# Patient Record
Sex: MALE | Race: WHITE
[De-identification: names, ages, dates, MRNs, and addresses within clinical notes are randomized per-mention and may not be internally consistent; named-entity substitution may affect disease eponyms.]

---

## 2018-11-02 ENCOUNTER — HOSPITAL ENCOUNTER (OUTPATIENT)
Dept: HOSPITAL 74 - JASU-SURG | Age: 53
Discharge: HOME | End: 2018-11-02
Attending: SURGERY
Payer: COMMERCIAL

## 2018-11-02 VITALS — TEMPERATURE: 99.6 F

## 2018-11-02 VITALS — DIASTOLIC BLOOD PRESSURE: 72 MMHG | SYSTOLIC BLOOD PRESSURE: 125 MMHG | HEART RATE: 88 BPM

## 2018-11-02 VITALS — BODY MASS INDEX: 27.8 KG/M2

## 2018-11-02 DIAGNOSIS — K40.90: Primary | ICD-10-CM

## 2018-11-02 LAB
APPEARANCE UR: CLEAR
BILIRUB UR STRIP.AUTO-MCNC: NEGATIVE MG/DL
COLOR UR: YELLOW
KETONES UR QL STRIP: NEGATIVE
LEUKOCYTE ESTERASE UR QL STRIP.AUTO: NEGATIVE
NITRITE UR QL STRIP: NEGATIVE
PH UR: 6 [PH] (ref 5–8)
PROT UR QL STRIP: NEGATIVE
PROT UR QL STRIP: NEGATIVE
SP GR UR: 1.02 (ref 1.01–1.03)
UROBILINOGEN UR STRIP-MCNC: NEGATIVE MG/DL (ref 0.2–1)

## 2018-11-02 PROCEDURE — 0YU60JZ SUPPLEMENT LEFT INGUINAL REGION WITH SYNTHETIC SUBSTITUTE, OPEN APPROACH: ICD-10-PCS | Performed by: SURGERY

## 2018-11-02 NOTE — SURG
Surgery First Assist Note


First Assist: Tra Roca PA-C (Suzy)


Date of Service: 11/02/18


Diagnosis: 





 left inguinal hernia


Procedure: 


Repair left inguinal hernia w/mesh


I was present for the entirety of the operative procedure. For further detail, 

please refer to operative report.








Visit type





- Case Type


Case Type: Scheduled





- Emergency


Emergency Visit: No





- New patient


This patient is new to me today: Yes


Date on this admission: 11/02/18





- Critical Care


Critical Care patient: No

## 2018-11-02 NOTE — OP
Operative Note





- Note:


Operative Date: 11/02/18


Pre-Operative Diagnosis: left inguinal hernia


Operation: repair left inguinal hernia w/mesh


Findings: 





sliding indirect left inguinal hernia


Surgeon: Chago Haider


Assistant: Tra Roca


Anesthesiologist/CRNA: [place]


Anesthesia: General


Specimens Removed: lipoma of the cord


Estimated Blood Loss (mls): 15

## 2018-11-12 NOTE — OP
DATE OF OPERATION:  11/02/2018

 

PREOPERATIVE DIAGNOSIS:  Left inguinal hernia.  

 

POSTOPERATIVE DIAGNOSIS:  Left inguinal hernia.  

 

PROCEDURE:  Repair left inguinal hernia with mesh.  

 

SURGEON:  Chago Haider M.D. 

 

ASSISTANT:  SULMA Bonilla 

 

ANESTHESIA:  General anesthesia.

 

OPERATIVE FINDINGS:  There was an indirect sliding left inguinal hernia.  The floor

of the inguinal canal was also attenuated, consistent with a direct hernia, and the

rest of the findings were unremarkable. 

 

DESCRIPTION OF PROCEDURE:  The patient was placed on the operating room table in the

supine position, and after the induction of general anesthesia, the patient's left

lower quadrant is prepped in Chloraprep and draped in sterile fashion, and timeout

was taken.  A transverse left groin incision made through skin and subcutaneous

tissue and through Jamia fascia.  The external oblique fascia was divided proximally

and then distally to the external ring in the direction of its fibers.  The cord

structures and nerve were elevated to the level of the pubic tubercle, and a Penrose

drain placed around them for retraction and identification purposes.  The indirect

hernia was identified with the sac in the substance of the cord.  It was bluntly

dissected up to the internal ring, where the sac was opened and the previously noted

findings were observed, which consisted of an indirect sliding hernia on the left

side.  The sac was then closed and the hernia reduced, and a piece of Parietex

ProGrip mesh was fashioned into the floor of the inguinal canal _____ at the pubic

tubercle, shelving edge, and conjoined tendon respectively with interrupted 2-0

Prolene.  The keyhole was created from the cord structures and the tails of the mesh

brought above the level of the internal ring _____ an anchor there with interrupted

2-0 Prolene.  Hemostasis was secured with electrocautery and wound copiously

irrigated with sterile saline.  The keyhole was checked for adequate sizing to allow

for the passage of the cord, and then the cord structures and nerve were returned to

their normal anatomic position, and the external oblique fascia closed over them,

recreating the external ring using continuous 2-0 Vicryl.  Jamia fascia was

reapproximated with interrupted 2-0 Vicryl, deep dermis with interrupted 3-0 Vicryl,

and the skin edges with 4-0 Monocryl in subcuticular fashion.  Steri-Strips and dry

sterile dressings were placed, and the procedure terminated at this point, and the

patient aroused from general anesthesia, and transferred to the post anesthesia care

unit in stable condition awake and alert.  Estimated blood loss 15 mL, replacements

crystalloid, drains none, specimens none.  I, Chago Haider, was physically present in

the operating room from the time the patient was placed on the operating room table

until he was transferred to the postanesthesia care unit in my accompaniment.  

 

 

MD LUZ MARIA Mora/[id number]

DD: 11/12/2018 13:14

DT: 11/12/2018 20:41

Job #:  [id number]

## 2023-07-24 ENCOUNTER — HOSPITAL ENCOUNTER (OUTPATIENT)
Dept: HOSPITAL 74 - JASU-SURG | Age: 58
Discharge: HOME | End: 2023-07-24
Attending: SURGERY
Payer: COMMERCIAL

## 2023-07-24 VITALS — HEART RATE: 76 BPM | RESPIRATION RATE: 16 BRPM | DIASTOLIC BLOOD PRESSURE: 79 MMHG | SYSTOLIC BLOOD PRESSURE: 140 MMHG

## 2023-07-24 VITALS — BODY MASS INDEX: 26.6 KG/M2

## 2023-07-24 VITALS — TEMPERATURE: 98 F

## 2023-07-24 DIAGNOSIS — K40.90: ICD-10-CM

## 2023-07-24 DIAGNOSIS — K42.9: Primary | ICD-10-CM

## 2023-07-24 PROCEDURE — 49650 LAP ING HERNIA REPAIR INIT: CPT

## 2023-07-24 PROCEDURE — 0YU54JZ SUPPLEMENT RIGHT INGUINAL REGION WITH SYNTHETIC SUBSTITUTE, PERCUTANEOUS ENDOSCOPIC APPROACH: ICD-10-PCS | Performed by: SURGERY

## 2023-07-24 PROCEDURE — C1781 MESH (IMPLANTABLE): HCPCS

## 2023-07-24 PROCEDURE — 49591 RPR AA HRN 1ST < 3 CM RDC: CPT

## 2023-07-24 PROCEDURE — 0WQF0ZZ REPAIR ABDOMINAL WALL, OPEN APPROACH: ICD-10-PCS | Performed by: SURGERY

## 2023-07-24 PROCEDURE — 8E0W4CZ ROBOTIC ASSISTED PROCEDURE OF TRUNK REGION, PERCUTANEOUS ENDOSCOPIC APPROACH: ICD-10-PCS | Performed by: SURGERY

## 2025-02-17 ENCOUNTER — HOSPITAL ENCOUNTER (OUTPATIENT)
Dept: HOSPITAL 74 - JER | Age: 60
Setting detail: OBSERVATION
LOS: 1 days | Discharge: HOME | End: 2025-02-18
Attending: INTERNAL MEDICINE | Admitting: STUDENT IN AN ORGANIZED HEALTH CARE EDUCATION/TRAINING PROGRAM
Payer: COMMERCIAL

## 2025-02-17 VITALS — RESPIRATION RATE: 18 BRPM

## 2025-02-17 VITALS — BODY MASS INDEX: 23.6 KG/M2

## 2025-02-17 DIAGNOSIS — E78.5: ICD-10-CM

## 2025-02-17 DIAGNOSIS — Z87.738: ICD-10-CM

## 2025-02-17 DIAGNOSIS — K57.90: ICD-10-CM

## 2025-02-17 DIAGNOSIS — I71.9: ICD-10-CM

## 2025-02-17 DIAGNOSIS — K56.600: ICD-10-CM

## 2025-02-17 DIAGNOSIS — K52.9: Primary | ICD-10-CM

## 2025-02-17 DIAGNOSIS — Z88.0: ICD-10-CM

## 2025-02-17 LAB
ALBUMIN SERPL-MCNC: 4.4 G/DL (ref 3.4–5)
ALP SERPL-CCNC: 55 U/L (ref 45–117)
ALT SERPL-CCNC: 26 U/L (ref 13–61)
ANION GAP SERPL CALC-SCNC: 8 MMOL/L (ref 4–13)
APPEARANCE UR: CLEAR
AST SERPL-CCNC: 22 U/L (ref 15–37)
BASOPHILS # BLD: 0.4 % (ref 0–2)
BILIRUB SERPL-MCNC: 0.4 MG/DL (ref 0.2–1)
BILIRUB UR STRIP.AUTO-MCNC: NEGATIVE MG/DL
BUN SERPL-MCNC: 14.8 MG/DL (ref 7–18)
CALCIUM SERPL-MCNC: 9.5 MG/DL (ref 8.5–10.1)
CHLORIDE SERPL-SCNC: 102 MMOL/L (ref 98–107)
CO2 SERPL-SCNC: 29 MMOL/L (ref 21–32)
COLOR UR: YELLOW
CREAT SERPL-MCNC: 0.8 MG/DL (ref 0.55–1.3)
DEPRECATED RDW RBC AUTO: 13.1 % (ref 11.9–15.9)
EOSINOPHIL # BLD: 0.3 % (ref 0–4.5)
GLUCOSE SERPL-MCNC: 132 MG/DL (ref 74–106)
HCT VFR BLD CALC: 43.1 % (ref 35.4–49)
HGB BLD-MCNC: 14.9 GM/DL (ref 11.7–16.9)
KETONES UR QL STRIP: (no result)
LEUKOCYTE ESTERASE UR QL STRIP.AUTO: NEGATIVE
LYMPHOCYTES # BLD: 5.3 % (ref 8–40)
MAGNESIUM SERPL-MCNC: 2 MG/DL (ref 1.8–2.4)
MCH RBC QN AUTO: 31.4 PG (ref 25.7–33.7)
MCHC RBC AUTO-ENTMCNC: 34.5 G/DL (ref 32–35.9)
MCV RBC: 91.1 FL (ref 80–96)
MONOCYTES # BLD AUTO: 4.8 % (ref 3.8–10.2)
NEUTROPHILS # BLD: 89.2 % (ref 42.8–82.8)
NITRITE UR QL STRIP: NEGATIVE
PH UR: 8.5 [PH] (ref 5–8)
PLATELET # BLD AUTO: 150 10^3/UL (ref 134–434)
PMV BLD: 9.1 FL (ref 7.5–11.1)
POTASSIUM SERPLBLD-SCNC: 4.9 MMOL/L (ref 3.5–5.1)
PROT SERPL-MCNC: 7.3 G/DL (ref 6.4–8.2)
PROT UR QL STRIP: (no result)
PROT UR QL STRIP: NEGATIVE
RBC # BLD AUTO: 4.74 M/MM3 (ref 4–5.6)
SODIUM SERPL-SCNC: 139 MMOL/L (ref 136–145)
SP GR UR: 1.02 (ref 1.01–1.03)
UROBILINOGEN UR STRIP-MCNC: 1 MG/DL (ref 0.2–1)
WBC # BLD AUTO: 8.5 K/MM3 (ref 4–10)

## 2025-02-17 PROCEDURE — 3E0337Z INTRODUCTION OF ELECTROLYTIC AND WATER BALANCE SUBSTANCE INTO PERIPHERAL VEIN, PERCUTANEOUS APPROACH: ICD-10-PCS | Performed by: INTERNAL MEDICINE

## 2025-02-17 PROCEDURE — G0378 HOSPITAL OBSERVATION PER HR: HCPCS

## 2025-02-17 PROCEDURE — 3E033NZ INTRODUCTION OF ANALGESICS, HYPNOTICS, SEDATIVES INTO PERIPHERAL VEIN, PERCUTANEOUS APPROACH: ICD-10-PCS | Performed by: INTERNAL MEDICINE

## 2025-02-17 PROCEDURE — 3E033GC INTRODUCTION OF OTHER THERAPEUTIC SUBSTANCE INTO PERIPHERAL VEIN, PERCUTANEOUS APPROACH: ICD-10-PCS | Performed by: INTERNAL MEDICINE

## 2025-02-17 RX ADMIN — SODIUM CHLORIDE ONE ML: 9 INJECTION, SOLUTION INTRAVENOUS at 11:42

## 2025-02-17 RX ADMIN — ALUMINUM HYDROXIDE, MAGNESIUM HYDROXIDE, AND SIMETHICONE ONE ML: 200; 200; 20 SUSPENSION ORAL at 11:42

## 2025-02-17 RX ADMIN — SODIUM CHLORIDE, POTASSIUM CHLORIDE, SODIUM LACTATE AND CALCIUM CHLORIDE SCH MLS/HR: 600; 310; 30; 20 INJECTION, SOLUTION INTRAVENOUS at 18:54

## 2025-02-17 RX ADMIN — ACETAMINOPHEN PRN MG: 10 INJECTION, SOLUTION INTRAVENOUS at 21:43

## 2025-02-17 RX ADMIN — FAMOTIDINE ONE MLS/HR: 20 INJECTION, SOLUTION INTRAVENOUS at 11:42

## 2025-02-17 RX ADMIN — ACETAMINOPHEN ONE MG: 10 INJECTION, SOLUTION INTRAVENOUS at 11:42

## 2025-02-18 VITALS — SYSTOLIC BLOOD PRESSURE: 148 MMHG | DIASTOLIC BLOOD PRESSURE: 85 MMHG | TEMPERATURE: 99.1 F

## 2025-02-18 VITALS — HEART RATE: 61 BPM

## 2025-02-18 LAB
ANION GAP SERPL CALC-SCNC: 10 MMOL/L (ref 4–13)
BASOPHILS # BLD: 0.3 % (ref 0–2)
BUN SERPL-MCNC: 17.2 MG/DL (ref 7–18)
CALCIUM SERPL-MCNC: 8.9 MG/DL (ref 8.5–10.1)
CHLORIDE SERPL-SCNC: 101 MMOL/L (ref 98–107)
CO2 SERPL-SCNC: 26 MMOL/L (ref 21–32)
CREAT SERPL-MCNC: 0.8 MG/DL (ref 0.55–1.3)
DEPRECATED RDW RBC AUTO: 13.2 % (ref 11.9–15.9)
EOSINOPHIL # BLD: 0.3 % (ref 0–4.5)
GLUCOSE SERPL-MCNC: 131 MG/DL (ref 74–106)
HCT VFR BLD CALC: 42 % (ref 35.4–49)
HGB BLD-MCNC: 14.6 GM/DL (ref 11.7–16.9)
LYMPHOCYTES # BLD: 5.1 % (ref 8–40)
MCH RBC QN AUTO: 31.5 PG (ref 25.7–33.7)
MCHC RBC AUTO-ENTMCNC: 34.9 G/DL (ref 32–35.9)
MCV RBC: 90.3 FL (ref 80–96)
MONOCYTES # BLD AUTO: 6.7 % (ref 3.8–10.2)
NEUTROPHILS # BLD: 87.6 % (ref 42.8–82.8)
PLATELET # BLD AUTO: 148 10^3/UL (ref 134–434)
PMV BLD: 9 FL (ref 7.5–11.1)
POTASSIUM SERPLBLD-SCNC: 4 MMOL/L (ref 3.5–5.1)
RBC # BLD AUTO: 4.65 M/MM3 (ref 4–5.6)
SODIUM SERPL-SCNC: 137 MMOL/L (ref 136–145)
WBC # BLD AUTO: 8.8 K/MM3 (ref 4–10)

## 2025-02-18 RX ADMIN — INFLUENZA VIRUS VACCINE ONE: 15; 15; 15 SUSPENSION INTRAMUSCULAR at 05:53
